# Patient Record
Sex: MALE | Race: WHITE
[De-identification: names, ages, dates, MRNs, and addresses within clinical notes are randomized per-mention and may not be internally consistent; named-entity substitution may affect disease eponyms.]

---

## 2018-11-04 ENCOUNTER — HOSPITAL ENCOUNTER (EMERGENCY)
Dept: HOSPITAL 31 - C.ER | Age: 38
Discharge: HOME | End: 2018-11-04
Payer: COMMERCIAL

## 2018-11-04 VITALS
OXYGEN SATURATION: 99 % | TEMPERATURE: 98.4 F | HEART RATE: 88 BPM | RESPIRATION RATE: 20 BRPM | SYSTOLIC BLOOD PRESSURE: 128 MMHG | DIASTOLIC BLOOD PRESSURE: 78 MMHG

## 2018-11-04 VITALS — BODY MASS INDEX: 28.2 KG/M2
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
## 2018-11-04 DIAGNOSIS — S61.237A: Primary | ICD-10-CM
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
## 2018-11-04 LAB
ALBUMIN SERPL-MCNC: 4.6 G/DL (ref 3.5–5)
ALBUMIN/GLOB SERPL: 1.5 {RATIO} (ref 1–2.1)
ALT SERPL-CCNC: 45 U/L (ref 21–72)
AMYLASE SERPL-CCNC: 64 U/L (ref 30–110)
AST SERPL-CCNC: 29 U/L (ref 17–59)
BASOPHILS # BLD AUTO: 0 K/UL (ref 0–0.2)
BASOPHILS NFR BLD: 0.6 % (ref 0–2)
BUN SERPL-MCNC: 21 MG/DL (ref 9–20)
CALCIUM SERPL-MCNC: 9.7 MG/DL (ref 8.6–10.4)
EOSINOPHIL # BLD AUTO: 0.1 K/UL (ref 0–0.7)
EOSINOPHIL NFR BLD: 2 % (ref 0–4)
ERYTHROCYTE [DISTWIDTH] IN BLOOD BY AUTOMATED COUNT: 12.6 % (ref 11.5–14.5)
GFR NON-AFRICAN AMERICAN: > 60
HEPATITIS A IGM: NEGATIVE
HEPATITIS B CORE AB: NEGATIVE
HEPATITIS C ANTIBODY: NEGATIVE
HGB BLD-MCNC: 15.2 G/DL (ref 12–18)
LYMPHOCYTES # BLD AUTO: 1.7 K/UL (ref 1–4.3)
LYMPHOCYTES NFR BLD AUTO: 22.5 % (ref 20–40)
MCH RBC QN AUTO: 29 PG (ref 27–31)
MCHC RBC AUTO-ENTMCNC: 34.3 G/DL (ref 33–37)
MCV RBC AUTO: 84.5 FL (ref 80–94)
MONOCYTES # BLD: 0.5 K/UL (ref 0–0.8)
MONOCYTES NFR BLD: 7.5 % (ref 0–10)
NEUTROPHILS # BLD: 5 K/UL (ref 1.8–7)
NEUTROPHILS NFR BLD AUTO: 67.4 % (ref 50–75)
NRBC BLD AUTO-RTO: 0.1 % (ref 0–2)
PLATELET # BLD: 186 K/UL (ref 130–400)
PMV BLD AUTO: 10 FL (ref 7.2–11.7)
RBC # BLD AUTO: 5.24 MIL/UL (ref 4.4–5.9)
WBC # BLD AUTO: 7.3 K/UL (ref 4.8–10.8)

## 2018-11-04 NOTE — C.PDOC
Time Seen by Provider: 11/04/18 01:49 EDT


Chief Complaint (Nursing): Needle Stick





Past Medical History


Vital Signs: 





                                Last Vital Signs











Temp  97.9 F   11/04/18 01:47 EDT


 


Pulse  64   11/04/18 01:47 EDT


 


Resp  20   11/04/18 01:47 EDT


 


BP  140/87   11/04/18 01:47 EDT


 


Pulse Ox  99   11/04/18 01:47 EDT














- Medical History


PMH: 


   Denies: Chronic Kidney Disease


Surgical History: Endoscopy, Tonsillectomy





- Social History


Hx Alcohol Use: No


Hx Substance Use: No





- Immunization History


Hx Tetanus Toxoid Vaccination: No


Hx Influenza Vaccination: No


Hx Pneumococcal Vaccination: No





ED Course And Treatment


O2 Sat by Pulse Oximetry: 99





Disposition





- Disposition


Prescriptions: 


Dolutegravir Sodium [Tivicay] 50 mg PO DAILY #2 tab


Emtricitabine/Tenofovir Diso [Truvada 200 MG-300 MG] 1 tab PO DAILY #2 tab


Forms:  [employer] (English)

## 2018-11-04 NOTE — C.PDOC
History Of Present Illness


39 y/o male, who is an employee on [employer], states he was giving a gluteal 

injection and got a needle stick on his left fifth digit accidentally. States he

used alcohol to clean the area and is up to date with vaccinations. Unaware of 

pts status.  


Time Seen by Provider: 11/04/18 01:49 EDT


Chief Complaint (Nursing): Needle Stick


History Per: Patient


History/Exam Limitations: no limitations


Onset/Duration Of Symptoms: Hrs


Current Symptoms Are (Timing): Still Present





Past Medical History


Reviewed: Historical Data, Nursing Documentation, Vital Signs


Vital Signs: 





                                Last Vital Signs











Temp  97.9 F   11/04/18 01:47 EDT


 


Pulse  64   11/04/18 01:47 EDT


 


Resp  20   11/04/18 01:47 EDT


 


BP  140/87   11/04/18 01:47 EDT


 


Pulse Ox  99   11/04/18 01:47 EDT














- Medical History


PMH: 


   Denies: Chronic Kidney Disease


Surgical History: Endoscopy, Tonsillectomy


Family History: States: No Known Family Hx





- Social History


Hx Alcohol Use: No


Hx Substance Use: No





- Immunization History


Hx Tetanus Toxoid Vaccination: No


Hx Influenza Vaccination: No


Hx Pneumococcal Vaccination: No





Review Of Systems


Constitutional: Negative for: Fever, Chills


Cardiovascular: Negative for: Chest Pain


Respiratory: Negative for: Shortness of Breath


Gastrointestinal: Negative for: Nausea, Vomiting


Neurological: Negative for: Weakness, Numbness





Physical Exam





- Physical Exam


Appears: Non-toxic, No Acute Distress


Skin: Warm, Dry, Other (Puncture wound to palmar aspect of the distal left fifth

digit)


Head: Atraumatic, Normacephalic


Eye(s): bilateral: Normal Inspection, EOMI


Nose: Normal


Oral Mucosa: Moist


Chest: Symmetrical


Respiratory: No Accessory Muscle Use


Extremity: Normal ROM, No Tenderness, Capillary Refill (less than 2 seconds), No

Swelling


Extremity: Bilateral: Normal Color And Temperature, Normal ROM


Pulses: Left Radial: Normal, Right Radial: Normal


Neurological/Psych: Oriented x3, Normal Speech, Normal Sensation


Gait: Steady





ED Course And Treatment





- Laboratory Results


Result Diagrams: 


                                 11/04/18 02:26





                                 11/04/18 02:26


O2 Sat by Pulse Oximetry: 99 (RA)


Pulse Ox Interpretation: Normal


Progress Note: Labs and urinalysis ordered. Tivicay and truvada given. Wound 

care performed by RN. PEP given for 3 days. Explained patient to follow up with 

the results of the patient he was injecting. Patient did not want to wait for 

his labs to return and wants to be discharged. Case was discussed with Dr. Franklin who agreed to discharge patient home.





Disposition





- Disposition


Disposition: HOME/ ROUTINE


Disposition Time: 02:09


Condition: STABLE


Additional Instructions: 


Follow up with employee health on Monday. Return to ER if symptoms persist or 

worsen. 


Prescriptions: 


Dolutegravir Sodium [Tivicay] 50 mg PO DAILY #2 tab


Emtricitabine/Tenofovir Diso [Truvada 200 MG-300 MG] 1 tab PO DAILY #2 tab


Instructions:  Needle Stick Injuries (ED)


Forms:  [facility] [employer] (English)





- Clinical Impression


Clinical Impression: 


 Needle stick injury








- PA / NP / Resident Statement


MD/DO has reviewed & agrees with the documentation as recorded.





- Scribe Statement


The provider has reviewed the documentation as recorded by the Scribe





Dee Butcher





All medical record entries made by the Scribe were at my direction and 

personally dictated by me. I have reviewed the chart and agree that the record 

accurately reflects my personal performance of the history, physical exam, 

medical decision making, and the department course for this patient. I have also

personally directed, reviewed, and agree with the discharge instructions and 

disposition.